# Patient Record
Sex: MALE | Race: WHITE | Employment: OTHER | URBAN - METROPOLITAN AREA
[De-identification: names, ages, dates, MRNs, and addresses within clinical notes are randomized per-mention and may not be internally consistent; named-entity substitution may affect disease eponyms.]

---

## 2021-07-17 ENCOUNTER — HOSPITAL ENCOUNTER (EMERGENCY)
Age: 26
Discharge: HOME | End: 2021-07-17
Attending: EMERGENCY MEDICINE
Payer: COMMERCIAL

## 2021-07-17 VITALS
BODY MASS INDEX: 19.58 KG/M2 | DIASTOLIC BLOOD PRESSURE: 91 MMHG | OXYGEN SATURATION: 100 % | HEIGHT: 68 IN | RESPIRATION RATE: 16 BRPM | HEART RATE: 70 BPM | TEMPERATURE: 99.9 F | SYSTOLIC BLOOD PRESSURE: 145 MMHG | WEIGHT: 129.19 LBS

## 2021-07-17 DIAGNOSIS — Z20.822 COVID-19 RULED OUT: Primary | ICD-10-CM

## 2021-07-17 DIAGNOSIS — R09.81 NASAL CONGESTION: ICD-10-CM

## 2021-07-17 DIAGNOSIS — R43.0 LOSS OF SMELL: ICD-10-CM

## 2021-07-17 PROCEDURE — U0005 INFEC AGEN DETEC AMPLI PROBE: HCPCS

## 2021-07-17 PROCEDURE — 99283 EMERGENCY DEPT VISIT LOW MDM: CPT

## 2021-07-17 PROCEDURE — U0003 INFECTIOUS AGENT DETECTION BY NUCLEIC ACID (DNA OR RNA); SEVERE ACUTE RESPIRATORY SYNDROME CORONAVIRUS 2 (SARS-COV-2) (CORONAVIRUS DISEASE [COVID-19]), AMPLIFIED PROBE TECHNIQUE, MAKING USE OF HIGH THROUGHPUT TECHNOLOGIES AS DESCRIBED BY CMS-2020-01-R: HCPCS

## 2021-07-17 ASSESSMENT — PAIN DESCRIPTION - FREQUENCY: FREQUENCY: CONTINUOUS

## 2021-07-17 ASSESSMENT — PAIN DESCRIPTION - PROGRESSION: CLINICAL_PROGRESSION: GRADUALLY WORSENING

## 2021-07-17 ASSESSMENT — PAIN DESCRIPTION - DESCRIPTORS
DESCRIPTORS: ACHING;HEADACHE
DESCRIPTORS: ACHING

## 2021-07-17 ASSESSMENT — PAIN SCALES - GENERAL
PAINLEVEL_OUTOF10: 5
PAINLEVEL_OUTOF10: 5

## 2021-07-17 ASSESSMENT — PAIN DESCRIPTION - PAIN TYPE
TYPE: ACUTE PAIN
TYPE: ACUTE PAIN

## 2021-07-17 ASSESSMENT — PAIN - FUNCTIONAL ASSESSMENT: PAIN_FUNCTIONAL_ASSESSMENT: 0-10

## 2021-07-17 ASSESSMENT — PAIN DESCRIPTION - LOCATION
LOCATION: GENERALIZED
LOCATION: GENERALIZED;HEAD

## 2021-07-17 NOTE — ED PROVIDER NOTES
eMERGENCY dEPARTMENT eNCOUnter      Pt Name: Tre Lugo  MRN: [id number]  Shruthigftaylor 1995  Date of evaluation: 7/17/2021  Provider: Geovanna Coleman MD     [address] [place] [address]       Chief Complaint   Patient presents with    Generalized Body Aches     onset 2 days ago    Headache     onset 2 days ago    Nasal Congestion     loss of smell         HISTORY OF PRESENT ILLNESS   (Location/Symptom, Timing/Onset,Context/Setting, Quality, Duration, Modifying Factors, Severity) Note limiting factors. HPI    Tre Lugo is a 22 y.o. male who presents to the emergency department with generalized body ache for couple days as well as a headache and nasal congestion and loss of smell. Patient was exposed to Covid positive patient. Patient states lives with mom who was diagnosed Covid 2 days ago. Patient not having symptoms 2 days ago. Patient has been eating okay. There has been no fever. No chills. She has no medical issues no comorbidities. Nursing Notes were reviewed. REVIEW OFSYSTEMS    (2+ for level 4; 10+ for level 5)   Review of Systems    General: No fevers, chills or night sweats, No weight loss    Head:  No Sore throat,  No Ear Pain. Positive loss of smell. Taste is okay. Chest:  Nontender. No Cough, No SOB,  Chest Pain    GI: No abdominal pain or vomiting    : No dysuria or hematuria    Musculoskeletal: No unrelenting pain or night pain    Neurologic: No bowel or bladder incontinence, No saddle anesthesia, No leg weakness    All other systems reviewed and are negative. PAST MEDICAL HISTORY   History reviewed. No pertinent past medical history. SURGICAL HISTORY     History reviewed. No pertinent surgical history. CURRENT MEDICATIONS       Previous Medications    No medications on file       ALLERGIES     Patient has no known allergies. FAMILY HISTORY     History reviewed. No pertinent family history.      SOCIAL HISTORY       Social History     Socioeconomic History    adenopathy. Nasal congestion. Cardiac: Regular rate and rhythm with no murmurs rubs or gallops  Pulmonary: Lungs are clear in all lung fields. No wheezing. No Rales. Abdomen: Soft and nontender. Negative hepatosplenomegaly. Bowel sounds are active  Extremities: Moving all extremities. No calf tenderness. Peripheral pulses all intact  Skin: No skin lesions. No rashes  Neurologic: Cranial nerves II through XII was grossly intact. Nonfocal neurological exam  Psychiatric: Patient is pleasant. Mood is appropriate. DIAGNOSTIC RESULTS     EKG (Per Emergency Physician):       RADIOLOGY (Per Emergency Physician): Interpretation per the Radiologist below, if available at the time of this note:  No results found. ED BEDSIDE ULTRASOUND:   Performed by ED Physician - none    LABS:  Labs Reviewed   ARCIO-80   COVID-19   COVID-19   COVID-19        All other labs were within normal range or not returned as of this dictation. Procedures      EMERGENCY DEPARTMENT COURSE and DIFFERENTIAL DIAGNOSIS/MDM:   Vitals:    Vitals:    07/17/21 1536   BP: (!) 145/91   Pulse: 70   Resp: 16   Temp: 99.9 °F (37.7 °C)   TempSrc: Oral   SpO2: 100%   Weight: 129 lb 3 oz (58.6 kg)   Height: 5' 8\" (1.727 m)       Medications - No data to display    MDM. Patient is a healthy 12-year-old with loss of taste and sinus congestion. Positive Covid exposure. Exam is reassuring. Patient will get a Covid test today. Recommend isolation for 10 to 14 days or until Covid test is back. Will probably take 24 to 48 hours. Patient discharged in good condition    REVAL:         CRITICAL CARE TIME   Total CriticalCare time was 0 minutes, excluding separately reportable procedures. There was a high probability of clinically significant/life threatening deterioration in the patient's condition which required my urgent intervention.      CONSULTS:  None    PROCEDURES:  Unless otherwise noted below, none     [unfilled]    FINAL IMPRESSION      1. COVID-19 ruled out    2. Nasal congestion    3. Loss of smell          DISPOSITION/PLAN   DISPOSITION        PATIENT REFERRED TO:  [facility]  [address] [place]  [id number] [phone]  Go in 1 week  If symptoms worsen      DISCHARGE MEDICATIONS:  New Prescriptions    No medications on file          (Please note:  Portions of this note were completed with a voice recognition program.Efforts were made to edit the dictations but occasionally words and phrases are mis-transcribed.)  Form v2016. J.5-cn    Katherine GREGORY MD (electronically signed)  Emergency Medicine Provider       Francisco Stuart MD  07/17/21 4886

## 2021-07-17 NOTE — LETTER
[address] [address]  Phone: [phone]    Patient: Sherri Smallwood  YOB: 1995  Date:07/17/2021 Time: 1607PM      The patient was called for notification of a POSITIVE test result for COVID-19. The following information was given to the patient:     The COVID-19 test result was positive   Treatment of coronavirus does not require an antibiotic   Remain isolated for 10 days since symptoms first appeared AND At least 3 days have passed after recovery (Recovery is defined as resolution of fever without the use of fever-reducing medications with progressive improvement or resolution of other symptoms).  Wash hands often with soap and water for at least 20 seconds or alternatively use hand  with at least 60% alcohol content   Cover coughs and sneezes   Wear a mask when around others if possible   Clean all high-touch surfaces every day, such as doorknobs and cellphones   Continually monitor symptoms. Contact a medical provider if symptoms are worsening, such as difficulty breathing.    For additional information, please visit the Centers for Disease Control and Prevention ([email or web address].

## 2021-07-17 NOTE — ED TRIAGE NOTES
Patient to [facility] 89 ambulatory complains of/o loss of smell this am, headache and body aches.  Denies cough of shortness of breath

## 2021-07-18 LAB — SARS-COV-2: DETECTED

## 2021-07-19 ENCOUNTER — CARE COORDINATION (OUTPATIENT)
Dept: CARE COORDINATION | Age: 26
End: 2021-07-19

## 2021-07-19 NOTE — CARE COORDINATION
Outreach call made and no answer. Unable to leave a message with [facility] contact information due to V not being set up. [facility] will outreach at another time.

## 2021-07-20 ENCOUNTER — CARE COORDINATION (OUTPATIENT)
Dept: CARE COORDINATION | Age: 26
End: 2021-07-20

## 2021-07-20 NOTE — CARE COORDINATION
Patient contacted regarding COVID-19 diagnosis. Discussed COVID-19 related testing which was available at this time. Test results were positive. Patient informed of results, if available? Yes. Ambulatory Care Manager contacted the patient by telephone to perform post discharge assessment. Call within 2 business days of discharge: Yes. Verified name and  with patient as identifiers. Provided introduction to self, and explanation of the CTN/ACM role, and reason for call due to risk factors for infection and/or exposure to COVID-19. Symptoms reviewed with patient who verbalized the following symptoms: pain or aching joints, cough, shortness of breath, loss of taste or smell, chills or shaking, sweating, diarrhea, no new symptoms, no worsening symptoms and patient said he gets SOB with activity, nasal congestion . Due to no new or worsening symptoms encounter was not routed to provider for escalation. Discussed follow-up appointments. If no appointment was previously scheduled, appointment scheduling offered: Patient educated on the importance of being established with a PCP. Patient educated that he can call the patient help line on the back of insurance card to help him find a PCP. [facility] sent a list of medical clinics to the patient through [address] with his permission. [facility] follow up appointment(s): No future appointments. Non-[facility] follow up appointment(s): N/A     Non-face-to-face services provided:  Obtained and reviewed discharge summary and/or continuity of care documents     Advance Care Planning:   Does patient have an Advance Directive:  patient declined education. Educated patient about risk for severe COVID-19 due to risk factors according to CDC guidelines. ACM reviewed discharge instructions, medical action plan and red flag symptoms with the patient who verbalized understanding. Discussed COVID vaccination status: Yes. Education provided on COVID-19 vaccination as appropriate. Discussed exposure protocols and quarantine with CDC Guidelines. Patient was given an opportunity to verbalize any questions and concerns and agrees to contact [facility] or health care provider for questions related to their healthcare. Reviewed and educated patient on any new and changed medications related to discharge diagnosis     Was patient discharged with a pulse oximeter? No Discussed and confirmed pulse oximeter discharge instructions and when to notify provider or seek emergency care. [facility] provided contact information. Plan for follow-up call in 5-7 days based on severity of symptoms and risk factors. Summary   Outbound call made to patient d/t recent ED visit. Reviewed discharge instructions regarding medication, follow up PCP, and S/S of when to return to the ED. Patient was not discharged with any medications. Patient educated on the importance of being established with a PCP. Patient educated that he can call the patient help line on the back of insurance card to help him find a PCP. [facility] sent a list of medical clinics to the patient through [address] with his permission. Patient educated on S/S to return to to the ED. Patient was sent clinics to possibly call to make a PCP appointment and COVID education though [facility], with his permission. Patient was provided the number to [address] @ [phone]. Patient declined wanting to provide a name of someone to make medical decision for him if he was unable to make them for himself. [facility] encouraged patient to drink plenty of fluid and rest. Patient denies having any other questions are concerns currently. Patient given [facility]'s contact information and is encouraged to call with any questions or concerns.       Plan   F/U on how patient is feeling and any needs     [facility]  [phone]  [address]  [address] [facility]

## 2021-07-27 ENCOUNTER — CARE COORDINATION (OUTPATIENT)
Dept: CARE COORDINATION | Age: 26
End: 2021-07-27

## 2021-07-27 NOTE — CARE COORDINATION
You Patient resolved from the Care Transitions episode on 07/27/21  Discussed COVID-19 related testing which was available at this time. Test results were positive. Patient informed of results, if available? Yes    Patient/family has been provided the following resources and education related to COVID-19:                         Signs, symptoms and red flags related to COVID-19            [facility] exposure and quarantine guidelines            [employer] exposure contact - [phone]            Contact for their local Department of Health                 Patient currently reports that the following symptoms have improved:  Patient said symptoms have resolved      Summary   Outbound call made to patient. Patient said he is feeling good. Patient's symptoms have resolved. Patient said he has not looked at the clinics sent to him, in attempt to obtain a PCP. ACM encouraged patient to get established with a PCP, and he voiced understanding. Patient denies having any other questions are concerns currently. No further outreach scheduled with this CTN/ACM. Episode of Care resolved. Patient has this CTN/ACM contact information if future needs arise.

## 2021-10-21 ENCOUNTER — HOSPITAL ENCOUNTER (EMERGENCY)
Age: 26
Discharge: HOME | End: 2021-10-21
Attending: EMERGENCY MEDICINE
Payer: COMMERCIAL

## 2021-10-21 VITALS
HEART RATE: 63 BPM | TEMPERATURE: 98.4 F | DIASTOLIC BLOOD PRESSURE: 85 MMHG | RESPIRATION RATE: 16 BRPM | OXYGEN SATURATION: 99 % | HEIGHT: 68 IN | WEIGHT: 134.26 LBS | BODY MASS INDEX: 20.35 KG/M2 | SYSTOLIC BLOOD PRESSURE: 142 MMHG

## 2021-10-21 DIAGNOSIS — J02.9 ACUTE PHARYNGITIS, UNSPECIFIED ETIOLOGY: Primary | ICD-10-CM

## 2021-10-21 LAB — S PYO AG THROAT QL: NEGATIVE

## 2021-10-21 PROCEDURE — 87081 CULTURE SCREEN ONLY: CPT

## 2021-10-21 PROCEDURE — 99283 EMERGENCY DEPT VISIT LOW MDM: CPT

## 2021-10-21 PROCEDURE — 87880 STREP A ASSAY W/OPTIC: CPT

## 2021-10-21 RX ORDER — AMOXICILLIN 875 MG/1
875 TABLET, COATED ORAL 2 TIMES DAILY
Qty: 14 TABLET | Refills: 0 | Status: SHIPPED | OUTPATIENT
Start: 2021-10-21 | End: 2021-10-28

## 2021-10-21 ASSESSMENT — PAIN DESCRIPTION - PAIN TYPE: TYPE: ACUTE PAIN

## 2021-10-21 ASSESSMENT — PAIN SCALES - GENERAL
PAINLEVEL_OUTOF10: 4
PAINLEVEL_OUTOF10: 4

## 2021-10-21 ASSESSMENT — PAIN DESCRIPTION - LOCATION: LOCATION: THROAT;HEAD

## 2021-10-21 ASSESSMENT — PAIN DESCRIPTION - DESCRIPTORS: DESCRIPTORS: ACHING

## 2021-10-21 NOTE — Clinical Note
Mary Ellen Pyle was seen and treated in our emergency department on 10/21/2021. He may return to work on 10/22/2021. If you have any questions or concerns, please don't hesitate to call.       Lenny Webster MD

## 2021-10-21 NOTE — ED PROVIDER NOTES
[facility] EMERGENCY DEPT VISIT      Patient Identification  Queen Paola is a 32 y.o. male. Chief Complaint   Pharyngitis (pt. c/o sore throat and enlarged lymph nodes onset Tuesday, also has had a headache )      History of Present Illness: This is a  32 y.o. male who presents ambulatory  to the ED with complaints of 3 day h/o headache and sore throat. Slight sinus congestion and minimal am cough. No sputum production. No chest pain or trouble breathing. Hurts to swallow but able to eat and drink. Headache tolerable, he gets migraines that are much worse. Taking dayquil. No vomiting or diarrhea. No known contact with strep or mono    Past Medical History:   Diagnosis Date    Headache        No past surgical history on file. No current facility-administered medications for this encounter.     Current Outpatient Medications:     amoxicillin (AMOXIL) 875 MG tablet, Take 1 tablet by mouth 2 times daily for 7 days, Disp: 14 tablet, Rfl: 0    No Known Allergies    Social History     Socioeconomic History    Marital status: Single     Spouse name: Not on file    Number of children: Not on file    Years of education: Not on file    Highest education level: Not on file   Occupational History    Not on file   Tobacco Use    Smoking status: Current Every Day Smoker     Types: Cigarettes, E-Cigarettes    Smokeless tobacco: Never Used   Vaping Use    Vaping Use: Every day    Substances: Nicotine   Substance and Sexual Activity    Alcohol use: Yes     Comment: occasionally    Drug use: Not Currently     Types: Marijuana    Sexual activity: Not on file   Other Topics Concern    Not on file   Social History Narrative    Not on file     Social Determinants of Health     Financial Resource Strain:     Difficulty of Paying Living Expenses:    Food Insecurity:     Worried About Running Out of Food in the Last Year:     Heidi of Food in the Last Year:    Transportation Needs:     Lack of Transportation (Medical):  Lack of Transportation (Non-Medical):    Physical Activity:     Days of Exercise per Week:     Minutes of Exercise per Session:    Stress:     Feeling of Stress :    Social Connections:     Frequency of Communication with Friends and Family:     Frequency of Social Gatherings with Friends and Family:     Attends [employer] Services:     Active Member of Clubs or Organizations:     Attends Club or Organization Meetings:     Marital Status:    Intimate Partner Violence:     Fear of Current or Ex-Partner:     Emotionally Abused:     Physically Abused:     Sexually Abused:        Nursing Notes Reviewed      ROS:  General: no fever  ENT: no sinus congestion, + sore throat  RESP: no cough, no shortness of breath  CARDIAC: no chest pain  GI: no abdominal pain, no vomiting, no diarrhea  Musculoskeletal: no arthralgia, no myalgia, no back pain,  no joint swelling  NEURO: + headache, no numbness, no weakness, no dizziness  DERM: no rash, no erythema, no ecchymosis, no wounds      PHYSICAL EXAM:  GENERAL APPEARANCE: Natasha Abraham is in no acute respiratory distress. Awake and alert. VITAL SIGNS:   ED Triage Vitals [10/21/21 1006]   Enc Vitals Group      BP (!) 142/85      Pulse 63      Resp 16      Temp 98.4 °F (36.9 °C)      Temp Source Oral      SpO2 99 %      Weight 134 lb 4.2 oz (60.9 kg)      Height 5' 8\" (1.727 m)      Head Circumference       Peak Flow       Pain Score       Pain Loc       Pain Edu? Excl. in [address]? HEAD: Normocephalic, atraumatic. EYES:  Extraocular muscles are intact. Conjunctivas are pink. Negative scleral icterus. ENT:  Mucous membranes are moist.  Pharynx with erythema no exudates. No stridor or hoarseness. Handling secretions well. No TM erythema  NECK: Nontender and supple. Cervical adenopathy present  CHEST: Clear to auscultation bilaterally. No rales, rhonchi, or wheezing. HEART:  Regular rate and rhythm. No murmurs.  Strong and equal pulses in the upper and lower extremities. ABDOMEN: Soft,  nondistended, positive bowel sounds. abdomen is nontender. MUSCULOSKELETAL:  Active range of motion of the upper and lower extremities. No edema. NEUROLOGICAL: Awake, alert and oriented x 3. Power intact in the upper and lower extremities. DERMATOLOGIC: No petechiae, rashes, or ecchymoses. ED COURSE AND MEDICAL DECISION MAKING:      Labs:  Results for orders placed or performed during the hospital encounter of 10/21/21   Strep Screen Group A Throat    Specimen: Throat   Result Value Ref Range    Rapid Strep A Screen Negative Negative       Treatment in the department:  Patient received no meds while in the ED. Medical decision making:    I estimate there is LOW risk for EPIGLOTTITIS, PNEUMONIA, PERITONSILLAR ABSCESS, AIRWAY IMPINGEMENT, RETROPHARYNGEAL ABSCESS, thus I consider the discharge disposition reasonable. Karen Pickett and I have discussed the diagnosis and risks, and we agree with discharging home to follow-up with their primary doctor. We also discussed returning to the Emergency Department immediately if new or worsening symptoms occur. We have discussed the symptoms which are most concerning (e.g., changing or worsening pain, trouble swallowing or breathing, neck stiffness, fever, mental status changes, recurrent vomitting or signs of dehydration) that necessitate immediate return. Clinical Impression:  1. Acute pharyngitis, unspecified etiology        Dispo:  Patient will be discharged  at this time. Patient was informed of this decision and agrees with plan. I have discussed lab and xray findings with patient and they understand. Questions were answered to the best of my ability. Discharge vitals:  Blood pressure (!) 142/85, pulse 63, temperature 98.4 °F (36.9 °C), temperature source Oral, resp. rate 16, height 5' 8\" (1.727 m), weight 134 lb 4.2 oz (60.9 kg), SpO2 99 %.     Prescriptions given:   Discharge Medication List as of 10/21/2021 10:53 AM      START taking these medications    Details   amoxicillin (AMOXIL) 875 MG tablet Take 1 tablet by mouth 2 times daily for 7 days, Disp-14 tablet, R-0Normal               This chart was created using dragon voice recognition software.         Eze Hernadez MD  10/21/21 2033

## 2021-10-23 LAB — S PYO THROAT QL CULT: NORMAL
